# Patient Record
Sex: MALE | Race: WHITE | Employment: FULL TIME | URBAN - METROPOLITAN AREA
[De-identification: names, ages, dates, MRNs, and addresses within clinical notes are randomized per-mention and may not be internally consistent; named-entity substitution may affect disease eponyms.]

---

## 2021-05-26 ENCOUNTER — RECORDS - HEALTHEAST (OUTPATIENT)
Dept: ADMINISTRATIVE | Facility: CLINIC | Age: 36
End: 2021-05-26

## 2021-07-23 ENCOUNTER — TRANSCRIBE ORDERS (OUTPATIENT)
Dept: RESPIRATORY THERAPY | Facility: CLINIC | Age: 36
End: 2021-07-23

## 2021-07-23 DIAGNOSIS — J45.51 SEVERE PERSISTENT ASTHMA WITH EXACERBATION (H): Primary | ICD-10-CM

## 2021-07-29 ENCOUNTER — TRANSCRIBE ORDERS (OUTPATIENT)
Dept: RESPIRATORY THERAPY | Facility: CLINIC | Age: 36
End: 2021-07-29

## 2021-07-29 DIAGNOSIS — J45.51 SEVERE PERSISTENT ASTHMA WITH (ACUTE) EXACERBATION (H): Primary | ICD-10-CM

## 2021-08-13 ENCOUNTER — HOSPITAL ENCOUNTER (OUTPATIENT)
Dept: RESPIRATORY THERAPY | Facility: CLINIC | Age: 36
Discharge: HOME | End: 2021-08-13
Attending: ALLERGY & IMMUNOLOGY | Admitting: ALLERGY & IMMUNOLOGY
Payer: COMMERCIAL

## 2021-08-13 DIAGNOSIS — J45.51 SEVERE PERSISTENT ASTHMA WITH (ACUTE) EXACERBATION (H): ICD-10-CM

## 2021-08-13 LAB
DLCOCOR-%PRED-PRE: 70 %
DLCOCOR-PRE: 22.41 ML/MIN/MMHG
DLCOUNC-%PRED-PRE: 75 %
DLCOUNC-PRE: 23.8 ML/MIN/MMHG
DLCOUNC-PRED: 31.58 ML/MIN/MMHG
ERV-%PRED-PRE: 21 %
ERV-PRE: 0.4 L
ERV-PRED: 1.87 L
EXPTIME-PRE: 6.99 SEC
FEF2575-%PRED-POST: 39 %
FEF2575-%PRED-PRE: 14 %
FEF2575-POST: 1.68 L/SEC
FEF2575-PRE: 0.6 L/SEC
FEF2575-PRED: 4.25 L/SEC
FEFMAX-%PRED-PRE: 32 %
FEFMAX-PRE: 3.3 L/SEC
FEFMAX-PRED: 10.22 L/SEC
FEV1-%PRED-PRE: 30 %
FEV1-PRE: 1.33 L
FEV1FEV6-PRE: 50 %
FEV1FEV6-PRED: 82 %
FEV1FVC-PRE: 50 %
FEV1FVC-PRED: 81 %
FEV1SVC-PRE: 51 %
FEV1SVC-PRED: 78 %
FIFMAX-PRE: 2.83 L/SEC
FRCPLETH-%PRED-PRE: 179 %
FRCPLETH-PRE: 6.11 L
FRCPLETH-PRED: 3.4 L
FVC-%PRED-PRE: 50 %
FVC-PRE: 2.68 L
FVC-PRED: 5.31 L
HGB BLD-MCNC: 17 G/DL (ref 13.3–17.7)
IC-%PRED-PRE: 60 %
IC-PRE: 2.22 L
IC-PRED: 3.66 L
RVPLETH-%PRED-PRE: 298 %
RVPLETH-PRE: 5.71 L
RVPLETH-PRED: 1.91 L
TLCPLETH-%PRED-PRE: 116 %
TLCPLETH-PRE: 8.33 L
TLCPLETH-PRED: 7.13 L
VA-%PRED-PRE: 82 %
VA-PRE: 5.5 L
VC-%PRED-PRE: 47 %
VC-PRE: 2.62 L
VC-PRED: 5.52 L

## 2021-08-13 PROCEDURE — 85018 HEMOGLOBIN: CPT

## 2021-08-13 PROCEDURE — 94729 DIFFUSING CAPACITY: CPT | Mod: 26 | Performed by: INTERNAL MEDICINE

## 2021-08-13 PROCEDURE — 999N000157 HC STATISTIC RCP TIME EA 10 MIN

## 2021-08-13 PROCEDURE — 94726 PLETHYSMOGRAPHY LUNG VOLUMES: CPT | Mod: 26 | Performed by: INTERNAL MEDICINE

## 2021-08-13 PROCEDURE — 94726 PLETHYSMOGRAPHY LUNG VOLUMES: CPT

## 2021-08-13 PROCEDURE — 94060 EVALUATION OF WHEEZING: CPT | Mod: 26 | Performed by: INTERNAL MEDICINE

## 2021-08-13 PROCEDURE — 94060 EVALUATION OF WHEEZING: CPT

## 2021-08-13 PROCEDURE — 36415 COLL VENOUS BLD VENIPUNCTURE: CPT

## 2021-08-13 PROCEDURE — 94729 DIFFUSING CAPACITY: CPT

## 2021-08-13 PROCEDURE — 94640 AIRWAY INHALATION TREATMENT: CPT

## 2021-08-13 RX ORDER — ALBUTEROL SULFATE 0.83 MG/ML
2.5 SOLUTION RESPIRATORY (INHALATION) ONCE
Status: COMPLETED | OUTPATIENT
Start: 2021-08-13 | End: 2021-08-13

## 2021-08-13 RX ADMIN — ALBUTEROL SULFATE 2.5 MG: 0.83 SOLUTION RESPIRATORY (INHALATION) at 07:34

## 2021-08-13 NOTE — PROGRESS NOTES
RESPIRATORY CARE NOTE     Patient Name: Pablito Zhang  Today's Date: 8/13/2021     Complete PFT done. Pt performed tests with good effort. Albuterol neb given.Test results meet ATS criteria. Results scanned into epic. Pt left in no distress.       Malika Cid, RT

## 2021-09-01 ENCOUNTER — HOSPITAL ENCOUNTER (OUTPATIENT)
Dept: CT IMAGING | Facility: CLINIC | Age: 36
Discharge: HOME | End: 2021-09-01
Attending: ALLERGY & IMMUNOLOGY | Admitting: ALLERGY & IMMUNOLOGY
Payer: COMMERCIAL

## 2021-09-01 DIAGNOSIS — J45.51 SEVERE PERSISTENT ASTHMA WITH (ACUTE) EXACERBATION (H): ICD-10-CM

## 2021-09-01 PROCEDURE — 71250 CT THORAX DX C-: CPT
